# Patient Record
Sex: FEMALE | Race: WHITE | NOT HISPANIC OR LATINO | URBAN - METROPOLITAN AREA
[De-identification: names, ages, dates, MRNs, and addresses within clinical notes are randomized per-mention and may not be internally consistent; named-entity substitution may affect disease eponyms.]

---

## 2020-01-22 ENCOUNTER — HOSPITAL ENCOUNTER (OUTPATIENT)
Dept: NUCLEAR MEDICINE | Facility: CLINIC | Age: 60
Setting detail: NUCLEAR MEDICINE
End: 2020-01-22
Attending: SPECIALIST
Payer: COMMERCIAL

## 2020-01-22 ENCOUNTER — HOSPITAL ENCOUNTER (OUTPATIENT)
Dept: NUCLEAR MEDICINE | Facility: CLINIC | Age: 60
Setting detail: NUCLEAR MEDICINE
Discharge: HOME | End: 2020-01-22
Attending: SPECIALIST | Admitting: SPECIALIST
Payer: COMMERCIAL

## 2020-01-22 DIAGNOSIS — E21.3 HYPERPARATHYROIDISM (H): ICD-10-CM

## 2020-01-22 PROCEDURE — 78072 PARATHYRD PLANAR W/SPECT&CT: CPT

## 2020-01-22 PROCEDURE — 34300033 ZZH RX 343: Performed by: RADIOLOGY

## 2020-01-22 PROCEDURE — A9500 TC99M SESTAMIBI: HCPCS | Performed by: RADIOLOGY

## 2020-01-22 RX ADMIN — Medication 28.5 MILLICURIE: at 08:58

## 2021-01-04 ENCOUNTER — HEALTH MAINTENANCE LETTER (OUTPATIENT)
Age: 61
End: 2021-01-04

## 2021-02-10 ENCOUNTER — TELEPHONE (OUTPATIENT)
Dept: SURGERY | Facility: CLINIC | Age: 61
End: 2021-02-10

## 2021-02-10 NOTE — TELEPHONE ENCOUNTER
Called patient regarding referral from Dr Kiara Garza for thyroid to [facility], VM left with call back number

## 2021-02-12 ENCOUNTER — TELEPHONE (OUTPATIENT)
Dept: SURGERY | Facility: CLINIC | Age: 61
End: 2021-02-12

## 2021-02-18 ENCOUNTER — OFFICE VISIT (OUTPATIENT)
Dept: SURGERY | Facility: CLINIC | Age: 61
End: 2021-02-18
Payer: COMMERCIAL

## 2021-02-18 VITALS
HEIGHT: 62 IN | WEIGHT: 144 LBS | BODY MASS INDEX: 26.5 KG/M2 | DIASTOLIC BLOOD PRESSURE: 70 MMHG | HEART RATE: 83 BPM | SYSTOLIC BLOOD PRESSURE: 120 MMHG

## 2021-02-18 DIAGNOSIS — E21.3 HYPERPARATHYROIDISM (H): ICD-10-CM

## 2021-02-18 DIAGNOSIS — N25.81 SECONDARY RENAL HYPERPARATHYROIDISM (H): Primary | ICD-10-CM

## 2021-02-18 PROCEDURE — 99205 OFFICE O/P NEW HI 60 MIN: CPT | Performed by: SURGERY

## 2021-02-18 RX ORDER — LEVOTHYROXINE SODIUM 150 UG/1
150 TABLET ORAL
COMMUNITY

## 2021-02-18 RX ORDER — ROSUVASTATIN CALCIUM 40 MG/1
40 TABLET, COATED ORAL EVERY EVENING
COMMUNITY

## 2021-02-18 RX ORDER — AMLODIPINE BESYLATE 5 MG/1
5 TABLET ORAL 2 TIMES DAILY
COMMUNITY

## 2021-02-18 RX ORDER — ASPIRIN 81 MG/1
81 TABLET ORAL EVERY EVENING
COMMUNITY

## 2021-02-18 RX ORDER — LISINOPRIL 10 MG/1
10 TABLET ORAL EVERY MORNING
COMMUNITY

## 2021-02-18 RX ORDER — CLOPIDOGREL BISULFATE 75 MG/1
75 TABLET ORAL EVERY MORNING
COMMUNITY

## 2021-02-18 ASSESSMENT — MIFFLIN-ST. JEOR: SCORE: 1163.49

## 2021-02-18 NOTE — LETTER
[facility]    [address], [address]  [place], [place] [address]  Phone [phone]  Fax [phone]    [address], [address]  [facility] [place] [address]  Phone [phone]  Fax [phone]    [email or web address]     February 18, 2021      Surgery Consultation, [facility], TAMMIE Haley MD, MD     Farzana Moreno MRN# [id number]   YOB: 1960 Age: 60 year old      PCP:  Gege Jonas [phone]     Chief Complaint: Hyperparathyroidism     Pt was seen in consultation from Gege Jonas.     History of Present Illness:  Farzana Moreno is a 60 year old female who presented with a history of hypercalcemia for several years.  This has been asymptomatic but progressive.  A PTH was drawn approximately 1 year ago and preliminary discussions were had about surgery for her hyperparathyroidism.  Unfortunately, this coincided with some significant cardiac issues, and patient went on to have several stents placed.  She has been on Plavix and aspirin, but is nearly a year out.  Her calcium has been slowly increasing and was 11.2 recently.  Her parathyroid hormone has been variable but the highest I saw was greater than 100.  No family history of parathyroid disease but the patient has been taking levothyroxine for hypothyroidism for many years.  She underwent a parathyroid sestamibi scan which did not reveal any suspicious parathyroid activity.  She has not had a bone scan and has no history of nephrolithiasis.  She is here to discuss her diagnosis.     PMH:  Farzana Moreno  has no past medical history on file.  PSH:  Farzana Moreno  has no past surgical history on file.     Home medications and allergies reviewed.     Social History:  Farzana Moreno  reports that she has never smoked. She has never used smokeless tobacco.  Family History:  Farzana Moreno  "family history is not on file.     ROS:  The 10 point Review of Systems is negative other than noted in the HPI.  No fevers or chills.  No recent sleep disturbances or difficulties focusing.     Physical Exam:  Blood pressure 120/70, pulse 83, height 1.562 m (5' 1.5\"), weight 65.3 kg (144 lb).  144 lbs 0 oz  Healthy-appearing female in no distress.  Patient has a pleasant affect and communicates well.   Pupils equal round and reactive to light.   No cervical lymphadenopathy or thyromegaly.  Good neck range of motion, no surgical scars.  Lung fields clear, breathing comfortably.   Heart normal sinus rhythm.  No murmurs rubs or gallops.  Abdomen soft, nontender, nondistended.  Skin warm, dry.  No obvious rashes or lesions.     All new lab and imaging data was reviewed.  Single isotope parathyroid scan performed recently did not reveal any suspicious parathyroid lesions.      Assessment/plan: Pleasant healthy 60-year-old female with recently diagnosed coronary disease, now presents for asymptomatic hyperparathyroidism.  Her diagnosis is fairly convincing and I think she would benefit from surgical correction.  I have recommended that we obtain a high-resolution neck CT scan to look further for any localizing signs of parathyroid adenoma.  If the CT scan does not reveal anything, I would advocate a bilateral neck exploration in 4 gland parathyroid visualization.  This may or may not be done as an outpatient depending on the length of the case and the degree of exploration.  I explained to the patient that in 85% of cases, hyperparathyroidism is caused by a single parathyroid adenoma.  The other 15% of cases are due to dual adenomas or parathyroid hyperplasia.  She understands the risks and benefits which may include failure to cure, hypoparathyroidism, bleeding, or injury to the recurrent laryngeal nerve.  We will get her CT scan scheduled and I will discuss the results with her and come up with a plan.  We would need " to hold her Plavix for 5 to 10 days prior to surgery.  Surgical co-morbities include coronary artery disease.     Toño Haley M.D.  [facility]  [phone]

## 2021-02-19 ENCOUNTER — HOSPITAL ENCOUNTER (OUTPATIENT)
Dept: CT IMAGING | Facility: CLINIC | Age: 61
Discharge: HOME | End: 2021-02-19
Attending: SURGERY | Admitting: SURGERY
Payer: COMMERCIAL

## 2021-02-19 DIAGNOSIS — E21.3 HYPERPARATHYROIDISM (H): ICD-10-CM

## 2021-02-19 PROCEDURE — 70492 CT SFT TSUE NCK W/O & W/DYE: CPT

## 2021-02-19 PROCEDURE — 250N000011 HC RX IP 250 OP 636: Performed by: SURGERY

## 2021-02-19 PROCEDURE — 250N000009 HC RX 250: Performed by: SURGERY

## 2021-02-19 RX ORDER — IOPAMIDOL 755 MG/ML
75 INJECTION, SOLUTION INTRAVASCULAR ONCE
Status: COMPLETED | OUTPATIENT
Start: 2021-02-19 | End: 2021-02-19

## 2021-02-19 RX ADMIN — SODIUM CHLORIDE 80 ML: 9 INJECTION, SOLUTION INTRAVENOUS at 17:21

## 2021-02-19 RX ADMIN — IOPAMIDOL 75 ML: 755 INJECTION, SOLUTION INTRAVENOUS at 17:20

## 2021-02-19 NOTE — PROGRESS NOTES
"[facility]         Mauricio Haley MD, MD    Farzana Moreno MRN# [id number]   YOB: 1960 Age: 60 year old     PCP:  Gege Jonas [phone]    Chief Complaint: Hyperparathyroidism    Pt was seen in consultation from Gege Jonas.    History of Present Illness:  Farzana Moreno is a 60 year old female who presented with a history of hypercalcemia for several years.  This has been asymptomatic but progressive.  A PTH was drawn approximately 1 year ago and preliminary discussions were had about surgery for her hyperparathyroidism.  Unfortunately, this coincided with some significant cardiac issues, and patient went on to have several stents placed.  She has been on Plavix and aspirin, but is nearly a year out.  Her calcium has been slowly increasing and was 11.2 recently.  Her parathyroid hormone has been variable but the highest I saw was greater than 100.  No family history of parathyroid disease but the patient has been taking levothyroxine for hypothyroidism for many years.  She underwent a parathyroid sestamibi scan which did not reveal any suspicious parathyroid activity.  She has not had a bone scan and has no history of nephrolithiasis.  She is here to discuss her diagnosis.    PMH:  Farzana Moreno  has no past medical history on file.  PSH:  Farzana Moreno  has no past surgical history on file.    Home medications and allergies reviewed.    Social History:  Farzana Moreno  reports that she has never smoked. She has never used smokeless tobacco.  Family History:  Farzana Moreno family history is not on file.    ROS:  The 10 point Review of Systems is negative other than noted in the HPI.  No fevers or chills.  No recent sleep disturbances or difficulties focusing.    Physical Exam:  Blood pressure 120/70, pulse 83, height 1.562 m (5' 1.5\"), weight 65.3 kg (144 lb).  144 lbs 0 oz  Healthy-appearing female in no " distress.  Patient has a pleasant affect and communicates well.   Pupils equal round and reactive to light.   No cervical lymphadenopathy or thyromegaly.  Good neck range of motion, no surgical scars.  Lung fields clear, breathing comfortably.   Heart normal sinus rhythm.  No murmurs rubs or gallops.  Abdomen soft, nontender, nondistended.  Skin warm, dry.  No obvious rashes or lesions.    All new lab and imaging data was reviewed.  Single isotope parathyroid scan performed recently did not reveal any suspicious parathyroid lesions.     Assessment/plan: Pleasant healthy 60-year-old female with recently diagnosed coronary disease, now presents for asymptomatic hyperparathyroidism.  Her diagnosis is fairly convincing and I think she would benefit from surgical correction.  I have recommended that we obtain a high-resolution neck CT scan to look further for any localizing signs of parathyroid adenoma.  If the CT scan does not reveal anything, I would advocate a bilateral neck exploration in 4 gland parathyroid visualization.  This may or may not be done as an outpatient depending on the length of the case and the degree of exploration.  I explained to the patient that in 85% of cases, hyperparathyroidism is caused by a single parathyroid adenoma.  The other 15% of cases are due to dual adenomas or parathyroid hyperplasia.  She understands the risks and benefits which may include failure to cure, hypoparathyroidism, bleeding, or injury to the recurrent laryngeal nerve.  We will get her CT scan scheduled and I will discuss the results with her and come up with a plan.  We would need to hold her Plavix for 5 to 10 days prior to surgery.  Surgical co-morbities include coronary artery disease.    Toño Haley M.D.  [facility]  [phone]    Please route or send letter to:  Primary Care Provider (PCP) and Referring Provider

## 2021-02-23 ENCOUNTER — TELEPHONE (OUTPATIENT)
Dept: SURGERY | Facility: CLINIC | Age: 61
End: 2021-02-23

## 2021-02-23 ENCOUNTER — PREP FOR PROCEDURE (OUTPATIENT)
Dept: SURGERY | Facility: CLINIC | Age: 61
End: 2021-02-23

## 2021-02-23 DIAGNOSIS — E21.3 HYPERPARATHYROIDISM (H): Primary | ICD-10-CM

## 2021-02-23 NOTE — TELEPHONE ENCOUNTER
Orders received for parathyroidectomy with Dr. Mauriico Haley.      Left message for patient to call me at her convenience to schedule surgery.     Marta BLAKELY    Surgery Coordinator  [facility]  [facility]  [phone]

## 2021-02-24 ENCOUNTER — TELEPHONE (OUTPATIENT)
Dept: SURGERY | Facility: CLINIC | Age: 61
End: 2021-02-24

## 2021-02-24 NOTE — TELEPHONE ENCOUNTER
Type of surgery: Right neck exploration with excision parathyroid adenoma  Location of surgery: [facility]  Date and time of surgery: 3/30/21 at 2pm  Surgeon: Dr. Mauricio Haley  Pre-Op Appt Date: Patient to schedule  Post-Op Appt Date: Patient to schedule   Packet sent out: Yes  Pre-cert/Authorization completed:  Not Applicable  Date: 2/24/21

## 2021-03-05 DIAGNOSIS — Z11.59 ENCOUNTER FOR SCREENING FOR OTHER VIRAL DISEASES: Primary | ICD-10-CM

## 2021-03-15 ENCOUNTER — TELEPHONE (OUTPATIENT)
Dept: SURGERY | Facility: CLINIC | Age: 61
End: 2021-03-15

## 2021-03-15 NOTE — TELEPHONE ENCOUNTER
Name of caller: Patient    Reason for Call:  Scheduled for parathyroidectomy 3/30 and would like to discuss recovery expectations.  Thought [facility] mentioned 1-2 days, but in looking online she is worried it might be longer (scheduled to babysit grandchildren a week after surgery)    Surgeon:  Dr. Haley     Recent Surgery:  No    If yes, when & what type:  N/A      Best phone number to reach pt at is: [phone]  Ok to leave a message with medical info? Yes.    Pharmacy preferred (if calling for a refill): N/A

## 2021-03-15 NOTE — TELEPHONE ENCOUNTER
Planned Procedure:  Right neck exploration with excision of parathyroid adenoma    Date:  03/30/21    Surgeon: Sudha    Patient wondering if she will be fine one week after surgery to help her  watch their grandkids.    Informed her that there is a weight and activity restriction for 1-2 weeks after surgery. But, she should be feeling well enough as long as she is not doing any lifting or anything overly strenuous.    She verbalized understanding and will call PRN.    Jyoti Her RN-BSN

## 2021-03-26 DIAGNOSIS — Z11.59 ENCOUNTER FOR SCREENING FOR OTHER VIRAL DISEASES: ICD-10-CM

## 2021-03-26 LAB
SARS-COV-2 RNA RESP QL NAA+PROBE: NORMAL
SPECIMEN SOURCE: NORMAL

## 2021-03-26 PROCEDURE — U0003 INFECTIOUS AGENT DETECTION BY NUCLEIC ACID (DNA OR RNA); SEVERE ACUTE RESPIRATORY SYNDROME CORONAVIRUS 2 (SARS-COV-2) (CORONAVIRUS DISEASE [COVID-19]), AMPLIFIED PROBE TECHNIQUE, MAKING USE OF HIGH THROUGHPUT TECHNOLOGIES AS DESCRIBED BY CMS-2020-01-R: HCPCS | Performed by: SURGERY

## 2021-03-26 PROCEDURE — U0005 INFEC AGEN DETEC AMPLI PROBE: HCPCS | Performed by: SURGERY

## 2021-03-27 LAB
LABORATORY COMMENT REPORT: NORMAL
SARS-COV-2 RNA RESP QL NAA+PROBE: NEGATIVE
SPECIMEN SOURCE: NORMAL

## 2021-03-29 ENCOUNTER — ANESTHESIA EVENT (OUTPATIENT)
Dept: SURGERY | Facility: CLINIC | Age: 61
End: 2021-03-29
Payer: COMMERCIAL

## 2021-03-29 RX ORDER — VITS A,C,E/LUTEIN/MINERALS 300MCG-200
1 TABLET ORAL DAILY
COMMUNITY

## 2021-03-29 RX ORDER — EZETIMIBE 10 MG/1
10 TABLET ORAL EVERY EVENING
COMMUNITY

## 2021-03-29 RX ORDER — NITROGLYCERIN 0.4 MG/1
0.4 TABLET SUBLINGUAL EVERY 5 MIN PRN
COMMUNITY

## 2021-03-29 RX ORDER — BETAMETHASONE DIPROPIONATE 0.5 MG/G
CREAM TOPICAL 2 TIMES DAILY
COMMUNITY

## 2021-03-30 ENCOUNTER — APPOINTMENT (OUTPATIENT)
Dept: SURGERY | Facility: PHYSICIAN GROUP | Age: 61
End: 2021-03-30
Payer: COMMERCIAL

## 2021-03-30 ENCOUNTER — ANESTHESIA (OUTPATIENT)
Dept: SURGERY | Facility: CLINIC | Age: 61
End: 2021-03-30
Payer: COMMERCIAL

## 2021-03-30 ENCOUNTER — HOSPITAL ENCOUNTER (OUTPATIENT)
Facility: CLINIC | Age: 61
Discharge: HOME | End: 2021-03-30
Attending: SURGERY | Admitting: SURGERY
Payer: COMMERCIAL

## 2021-03-30 VITALS
DIASTOLIC BLOOD PRESSURE: 81 MMHG | SYSTOLIC BLOOD PRESSURE: 142 MMHG | TEMPERATURE: 97.9 F | HEIGHT: 61 IN | HEART RATE: 75 BPM | BODY MASS INDEX: 27.75 KG/M2 | OXYGEN SATURATION: 97 % | RESPIRATION RATE: 14 BRPM | WEIGHT: 147 LBS

## 2021-03-30 DIAGNOSIS — E21.3 HYPERPARATHYROIDISM (H): ICD-10-CM

## 2021-03-30 LAB
ANION GAP SERPL CALCULATED.3IONS-SCNC: 3 MMOL/L (ref 3–14)
BUN SERPL-MCNC: 14 MG/DL (ref 7–30)
CALCIUM SERPL-MCNC: 9.9 MG/DL (ref 8.5–10.1)
CHLORIDE SERPL-SCNC: 109 MMOL/L (ref 94–109)
CO2 SERPL-SCNC: 26 MMOL/L (ref 20–32)
CREAT SERPL-MCNC: 0.57 MG/DL (ref 0.52–1.04)
GFR SERPL CREATININE-BSD FRML MDRD: >90 ML/MIN/{1.73_M2}
GLUCOSE SERPL-MCNC: 89 MG/DL (ref 70–99)
POTASSIUM SERPL-SCNC: 4.1 MMOL/L (ref 3.4–5.3)
PTH-INTACT SERPL-MCNC: 105 PG/ML (ref 12–64)
PTH-INTACT SERPL-MCNC: 18 PG/ML (ref 12–64)
PTH-INTACT SERPL-MCNC: 24 PG/ML (ref 12–64)
SODIUM SERPL-SCNC: 138 MMOL/L (ref 133–144)

## 2021-03-30 PROCEDURE — 272N000001 HC OR GENERAL SUPPLY STERILE: Performed by: SURGERY

## 2021-03-30 PROCEDURE — 370N000017 HC ANESTHESIA TECHNICAL FEE, PER MIN: Performed by: SURGERY

## 2021-03-30 PROCEDURE — 88331 PATH CONSLTJ SURG 1 BLK 1SPC: CPT | Mod: TC | Performed by: SURGERY

## 2021-03-30 PROCEDURE — 80048 BASIC METABOLIC PNL TOTAL CA: CPT | Performed by: SURGERY

## 2021-03-30 PROCEDURE — 250N000009 HC RX 250: Performed by: ANESTHESIOLOGY

## 2021-03-30 PROCEDURE — 250N000009 HC RX 250: Performed by: NURSE ANESTHETIST, CERTIFIED REGISTERED

## 2021-03-30 PROCEDURE — 250N000009 HC RX 250: Performed by: SURGERY

## 2021-03-30 PROCEDURE — 88331 PATH CONSLTJ SURG 1 BLK 1SPC: CPT | Mod: 26 | Performed by: PATHOLOGY

## 2021-03-30 PROCEDURE — 258N000003 HC RX IP 258 OP 636: Performed by: NURSE ANESTHETIST, CERTIFIED REGISTERED

## 2021-03-30 PROCEDURE — 88305 TISSUE EXAM BY PATHOLOGIST: CPT | Mod: 26 | Performed by: PATHOLOGY

## 2021-03-30 PROCEDURE — 710N000009 HC RECOVERY PHASE 1, LEVEL 1, PER MIN: Performed by: SURGERY

## 2021-03-30 PROCEDURE — 250N000025 HC SEVOFLURANE, PER MIN: Performed by: SURGERY

## 2021-03-30 PROCEDURE — 36415 COLL VENOUS BLD VENIPUNCTURE: CPT | Performed by: SURGERY

## 2021-03-30 PROCEDURE — 250N000011 HC RX IP 250 OP 636: Performed by: NURSE ANESTHETIST, CERTIFIED REGISTERED

## 2021-03-30 PROCEDURE — 88305 TISSUE EXAM BY PATHOLOGIST: CPT | Mod: TC | Performed by: SURGERY

## 2021-03-30 PROCEDURE — 999N000141 HC STATISTIC PRE-PROCEDURE NURSING ASSESSMENT: Performed by: SURGERY

## 2021-03-30 PROCEDURE — 250N000011 HC RX IP 250 OP 636: Performed by: SURGERY

## 2021-03-30 PROCEDURE — 360N000077 HC SURGERY LEVEL 4, PER MIN: Performed by: SURGERY

## 2021-03-30 PROCEDURE — 83970 ASSAY OF PARATHORMONE: CPT | Performed by: SURGERY

## 2021-03-30 RX ORDER — DEXAMETHASONE SODIUM PHOSPHATE 4 MG/ML
INJECTION, SOLUTION INTRA-ARTICULAR; INTRALESIONAL; INTRAMUSCULAR; INTRAVENOUS; SOFT TISSUE PRN
Status: DISCONTINUED | OUTPATIENT
Start: 2021-03-30 | End: 2021-03-30

## 2021-03-30 RX ORDER — OXYCODONE HYDROCHLORIDE 5 MG/1
5-10 TABLET ORAL
Qty: 8 TABLET | Refills: 0 | Status: SHIPPED | OUTPATIENT
Start: 2021-03-30 | End: 2021-04-13

## 2021-03-30 RX ORDER — SODIUM CHLORIDE, SODIUM LACTATE, POTASSIUM CHLORIDE, CALCIUM CHLORIDE 600; 310; 30; 20 MG/100ML; MG/100ML; MG/100ML; MG/100ML
INJECTION, SOLUTION INTRAVENOUS CONTINUOUS PRN
Status: DISCONTINUED | OUTPATIENT
Start: 2021-03-30 | End: 2021-03-30

## 2021-03-30 RX ORDER — LIDOCAINE HYDROCHLORIDE 20 MG/ML
INJECTION, SOLUTION INFILTRATION; PERINEURAL PRN
Status: DISCONTINUED | OUTPATIENT
Start: 2021-03-30 | End: 2021-03-30

## 2021-03-30 RX ORDER — ACETAMINOPHEN 325 MG/1
650 TABLET ORAL
Status: DISCONTINUED | OUTPATIENT
Start: 2021-03-30 | End: 2021-03-30 | Stop reason: HOSPADM

## 2021-03-30 RX ORDER — OXYCODONE HYDROCHLORIDE 5 MG/1
5 TABLET ORAL
Status: DISCONTINUED | OUTPATIENT
Start: 2021-03-30 | End: 2021-03-30 | Stop reason: HOSPADM

## 2021-03-30 RX ORDER — ONDANSETRON 4 MG/1
4 TABLET, ORALLY DISINTEGRATING ORAL EVERY 30 MIN PRN
Status: DISCONTINUED | OUTPATIENT
Start: 2021-03-30 | End: 2021-03-30 | Stop reason: HOSPADM

## 2021-03-30 RX ORDER — NALOXONE HYDROCHLORIDE 0.4 MG/ML
0.2 INJECTION, SOLUTION INTRAMUSCULAR; INTRAVENOUS; SUBCUTANEOUS
Status: DISCONTINUED | OUTPATIENT
Start: 2021-03-30 | End: 2021-03-30 | Stop reason: HOSPADM

## 2021-03-30 RX ORDER — MAGNESIUM HYDROXIDE 1200 MG/15ML
LIQUID ORAL PRN
Status: DISCONTINUED | OUTPATIENT
Start: 2021-03-30 | End: 2021-03-30 | Stop reason: HOSPADM

## 2021-03-30 RX ORDER — AMOXICILLIN 250 MG
1-2 CAPSULE ORAL 2 TIMES DAILY
Qty: 30 TABLET | Refills: 0 | Status: SHIPPED | OUTPATIENT
Start: 2021-03-30

## 2021-03-30 RX ORDER — FENTANYL CITRATE 50 UG/ML
25-50 INJECTION, SOLUTION INTRAMUSCULAR; INTRAVENOUS EVERY 5 MIN PRN
Status: DISCONTINUED | OUTPATIENT
Start: 2021-03-30 | End: 2021-03-30 | Stop reason: HOSPADM

## 2021-03-30 RX ORDER — NALOXONE HYDROCHLORIDE 0.4 MG/ML
0.4 INJECTION, SOLUTION INTRAMUSCULAR; INTRAVENOUS; SUBCUTANEOUS
Status: DISCONTINUED | OUTPATIENT
Start: 2021-03-30 | End: 2021-03-30 | Stop reason: HOSPADM

## 2021-03-30 RX ORDER — PROPOFOL 10 MG/ML
INJECTION, EMULSION INTRAVENOUS CONTINUOUS PRN
Status: DISCONTINUED | OUTPATIENT
Start: 2021-03-30 | End: 2021-03-30

## 2021-03-30 RX ORDER — SCOLOPAMINE TRANSDERMAL SYSTEM 1 MG/1
1 PATCH, EXTENDED RELEASE TRANSDERMAL
Status: DISCONTINUED | OUTPATIENT
Start: 2021-03-30 | End: 2021-03-30 | Stop reason: HOSPADM

## 2021-03-30 RX ORDER — HYDROMORPHONE HYDROCHLORIDE 1 MG/ML
.3-.5 INJECTION, SOLUTION INTRAMUSCULAR; INTRAVENOUS; SUBCUTANEOUS EVERY 10 MIN PRN
Status: DISCONTINUED | OUTPATIENT
Start: 2021-03-30 | End: 2021-03-30 | Stop reason: HOSPADM

## 2021-03-30 RX ORDER — EPHEDRINE SULFATE 50 MG/ML
INJECTION, SOLUTION INTRAMUSCULAR; INTRAVENOUS; SUBCUTANEOUS PRN
Status: DISCONTINUED | OUTPATIENT
Start: 2021-03-30 | End: 2021-03-30

## 2021-03-30 RX ORDER — BUPIVACAINE HYDROCHLORIDE AND EPINEPHRINE 2.5; 5 MG/ML; UG/ML
INJECTION, SOLUTION EPIDURAL; INFILTRATION; INTRACAUDAL; PERINEURAL
Status: DISCONTINUED
Start: 2021-03-30 | End: 2021-03-30 | Stop reason: HOSPADM

## 2021-03-30 RX ORDER — SODIUM CHLORIDE, SODIUM LACTATE, POTASSIUM CHLORIDE, CALCIUM CHLORIDE 600; 310; 30; 20 MG/100ML; MG/100ML; MG/100ML; MG/100ML
INJECTION, SOLUTION INTRAVENOUS CONTINUOUS
Status: DISCONTINUED | OUTPATIENT
Start: 2021-03-30 | End: 2021-03-30 | Stop reason: HOSPADM

## 2021-03-30 RX ORDER — CEFAZOLIN SODIUM 2 G/100ML
2 INJECTION, SOLUTION INTRAVENOUS SEE ADMIN INSTRUCTIONS
Status: DISCONTINUED | OUTPATIENT
Start: 2021-03-30 | End: 2021-03-30 | Stop reason: HOSPADM

## 2021-03-30 RX ORDER — PROPOFOL 10 MG/ML
INJECTION, EMULSION INTRAVENOUS PRN
Status: DISCONTINUED | OUTPATIENT
Start: 2021-03-30 | End: 2021-03-30

## 2021-03-30 RX ORDER — CEFAZOLIN SODIUM 2 G/100ML
2 INJECTION, SOLUTION INTRAVENOUS
Status: DISCONTINUED | OUTPATIENT
Start: 2021-03-30 | End: 2021-03-30 | Stop reason: HOSPADM

## 2021-03-30 RX ORDER — ONDANSETRON 2 MG/ML
4 INJECTION INTRAMUSCULAR; INTRAVENOUS EVERY 30 MIN PRN
Status: DISCONTINUED | OUTPATIENT
Start: 2021-03-30 | End: 2021-03-30 | Stop reason: HOSPADM

## 2021-03-30 RX ORDER — REMIFENTANIL HYDROCHLORIDE 1 MG/ML
INJECTION, POWDER, LYOPHILIZED, FOR SOLUTION INTRAVENOUS CONTINUOUS PRN
Status: DISCONTINUED | OUTPATIENT
Start: 2021-03-30 | End: 2021-03-30

## 2021-03-30 RX ORDER — ONDANSETRON 2 MG/ML
INJECTION INTRAMUSCULAR; INTRAVENOUS PRN
Status: DISCONTINUED | OUTPATIENT
Start: 2021-03-30 | End: 2021-03-30

## 2021-03-30 RX ORDER — MEPERIDINE HYDROCHLORIDE 25 MG/ML
12.5 INJECTION INTRAMUSCULAR; INTRAVENOUS; SUBCUTANEOUS
Status: DISCONTINUED | OUTPATIENT
Start: 2021-03-30 | End: 2021-03-30 | Stop reason: HOSPADM

## 2021-03-30 RX ORDER — FENTANYL CITRATE 50 UG/ML
INJECTION, SOLUTION INTRAMUSCULAR; INTRAVENOUS PRN
Status: DISCONTINUED | OUTPATIENT
Start: 2021-03-30 | End: 2021-03-30

## 2021-03-30 RX ORDER — BUPIVACAINE HYDROCHLORIDE AND EPINEPHRINE 2.5; 5 MG/ML; UG/ML
INJECTION, SOLUTION INFILTRATION; PERINEURAL PRN
Status: DISCONTINUED | OUTPATIENT
Start: 2021-03-30 | End: 2021-03-30 | Stop reason: HOSPADM

## 2021-03-30 RX ADMIN — Medication 5 MG: at 14:39

## 2021-03-30 RX ADMIN — PROPOFOL 200 MG: 10 INJECTION, EMULSION INTRAVENOUS at 14:18

## 2021-03-30 RX ADMIN — Medication 5 MG: at 14:33

## 2021-03-30 RX ADMIN — PHENYLEPHRINE HYDROCHLORIDE 100 MCG: 10 INJECTION INTRAVENOUS at 15:11

## 2021-03-30 RX ADMIN — ONDANSETRON 4 MG: 2 INJECTION INTRAMUSCULAR; INTRAVENOUS at 16:38

## 2021-03-30 RX ADMIN — FENTANYL CITRATE 100 MCG: 50 INJECTION, SOLUTION INTRAMUSCULAR; INTRAVENOUS at 14:18

## 2021-03-30 RX ADMIN — PROPOFOL 30 MCG/KG/MIN: 10 INJECTION, EMULSION INTRAVENOUS at 14:23

## 2021-03-30 RX ADMIN — PHENYLEPHRINE HYDROCHLORIDE 100 MCG: 10 INJECTION INTRAVENOUS at 14:47

## 2021-03-30 RX ADMIN — SCOPOLAMINE 1 PATCH: 1 PATCH TRANSDERMAL at 13:11

## 2021-03-30 RX ADMIN — SODIUM CHLORIDE, POTASSIUM CHLORIDE, SODIUM LACTATE AND CALCIUM CHLORIDE: 600; 310; 30; 20 INJECTION, SOLUTION INTRAVENOUS at 14:11

## 2021-03-30 RX ADMIN — MIDAZOLAM 2 MG: 1 INJECTION INTRAMUSCULAR; INTRAVENOUS at 14:12

## 2021-03-30 RX ADMIN — PHENYLEPHRINE HYDROCHLORIDE 100 MCG: 10 INJECTION INTRAVENOUS at 14:55

## 2021-03-30 RX ADMIN — PHENYLEPHRINE HYDROCHLORIDE 0.25 MCG/KG/MIN: 10 INJECTION INTRAVENOUS at 15:06

## 2021-03-30 RX ADMIN — REMIFENTANIL HYDROCHLORIDE 0.05 MCG/KG/MIN: 1 INJECTION, POWDER, LYOPHILIZED, FOR SOLUTION INTRAVENOUS at 14:23

## 2021-03-30 RX ADMIN — CEFAZOLIN SODIUM 2 G: 2 INJECTION, SOLUTION INTRAVENOUS at 14:32

## 2021-03-30 RX ADMIN — Medication 5 MG: at 14:36

## 2021-03-30 RX ADMIN — SODIUM CHLORIDE, POTASSIUM CHLORIDE, SODIUM LACTATE AND CALCIUM CHLORIDE: 600; 310; 30; 20 INJECTION, SOLUTION INTRAVENOUS at 14:28

## 2021-03-30 RX ADMIN — SUCCINYLCHOLINE CHLORIDE 100 MG: 20 INJECTION, SOLUTION INTRAMUSCULAR; INTRAVENOUS; PARENTERAL at 14:18

## 2021-03-30 RX ADMIN — DEXAMETHASONE SODIUM PHOSPHATE 4 MG: 4 INJECTION, SOLUTION INTRA-ARTICULAR; INTRALESIONAL; INTRAMUSCULAR; INTRAVENOUS; SOFT TISSUE at 14:34

## 2021-03-30 RX ADMIN — LIDOCAINE HYDROCHLORIDE 100 MG: 20 INJECTION, SOLUTION INFILTRATION; PERINEURAL at 14:18

## 2021-03-30 ASSESSMENT — LIFESTYLE VARIABLES: TOBACCO_USE: 0

## 2021-03-30 ASSESSMENT — MIFFLIN-ST. JEOR: SCORE: 1169.17

## 2021-03-30 NOTE — ANESTHESIA CARE TRANSFER NOTE
Patient: Farzana Moreno    Procedure(s):  RIGHT NECK EXPLORATION WITH EXCISION PARATHYROID ADENOMA    Diagnosis: Hyperparathyroidism (H) [E21.3]  Diagnosis Additional Information: No value filed.    Anesthesia Type:   General     Note:    Oropharynx: oropharynx clear of all foreign objects and spontaneously breathing  Level of Consciousness: awake  Oxygen Supplementation: face mask  Level of Supplemental Oxygen (L/min / FiO2): 4  Independent Airway: airway patency satisfactory and stable  Dentition: dentition unchanged  Vital Signs Stable: post-procedure vital signs reviewed and stable  Report to RN Given: handoff report given  Patient transferred to: PACU  Comments: Neuromuscular blockade not used after succinylcholine for intubation, spontaneous return of TOF 4/4 with sustained tetany, spontaneous respirations, adequate tidal volumes, followed commands to voice, oropharynx suctioned with soft flexible catheter, extubated with suction, airway patent after extubation.  Oxygen via facemask at 4 liters per minute to PACU. Oxygen tubing connected to wall O2 in PACU, SpO2, NiBP, and EKG monitors and alarms on and functioning, report on patient's clinical status given to PACU RN, RN questions answered.     Handoff Report: Identifed the Patient, Identified the Reponsible Provider, Reviewed the pertinent medical history, Discussed the surgical course, Reviewed Intra-OP anesthesia mangement and issues during anesthesia, Set expectations for post-procedure period and Allowed opportunity for questions and acknowledgement of understanding      Vitals: (Last set prior to Anesthesia Care Transfer)  CRNA VITALS  3/30/2021 1617 - 3/30/2021 1652      3/30/2021             Resp Rate (set):  10        Electronically Signed By: ALONDRA Traylor CRNA  March 30, 2021  4:52 PM

## 2021-03-30 NOTE — BRIEF OP NOTE
[facility]    Brief Operative Note    Pre-operative diagnosis: Hyperparathyroidism (H) [E21.3]  Post-operative diagnosis Same as pre-operative diagnosis    Procedure: Procedure(s):  RIGHT NECK EXPLORATION WITH EXCISION PARATHYROID ADENOMA  Surgeon: Surgeon(s) and Role:     * Mauricio Haley MD - Primary     * Dorinda Millard MD - Resident - Assisting  Anesthesia: General   Estimated blood loss: Minimal  Drains: None  Specimens:   ID Type Source Tests Collected by Time Destination   A : RIGHT SUPERIOR PARATHYROID  Tissue Parathyroid SURGICAL PATHOLOGY EXAM Mauricio Haley MD 3/30/2021  3:14 PM      Findings:   Right inferior parathyroid normal appearing. Right superior parathyroid abnormal and resected.  Complications: None.  Implants: * No implants in log *

## 2021-03-30 NOTE — DISCHARGE INSTRUCTIONS
**If you have concerns or questions about your procedure,    please contact Dr Haley at  [phone]**        Same Day Surgery Discharge Instructions for  Sedation and General Anesthesia       It's not unusual to feel dizzy, light-headed or faint for up to 24 hours after surgery or while taking pain medication.  If you have these symptoms: sit for a few minutes before standing and have someone assist you when you get up to walk or use the bathroom.      You should rest and relax for the next 24 hours. We recommend you make arrangements to have an adult stay with you for at least 24 hours after your discharge.  Avoid hazardous and strenuous activity.      DO NOT DRIVE any vehicle or operate mechanical equipment for 24 hours following the end of your surgery.  Even though you may feel normal, your reactions may be affected by the medication you have received.      Do not drink alcoholic beverages for 24 hours following surgery.       Slowly progress to your regular diet as you feel able. It's not unusual to feel nauseated and/or vomit after receiving anesthesia.  If you develop these symptoms, drink clear liquids (apple juice, ginger ale, broth, 7-up, etc. ) until you feel better.  If your nausea and vomiting persists for 24 hours, please notify your surgeon.        All narcotic pain medications, along with inactivity and anesthesia, can cause constipation. Drinking plenty of liquids and increasing fiber intake will help.      For any questions of a medical nature, call your surgeon.      Do not make important decisions for 24 hours.      If you had general anesthesia, you may have a sore throat for a couple of days related to the breathing tube used during surgery.  You may use Cepacol lozenges to help with this discomfort.  If it worsens or if you develop a fever, contact your surgeon.       If you feel your pain is not well managed with the pain medications prescribed by your surgeon, please contact your  surgeon's office to let them know so they can address your concerns.       CoVid 19 Information    We want to give you information regarding Covid. Please consult your primary care provider with any questions you might have.     Patient who have symptoms (cough, fever, or shortness of breath), need to isolate for 7 days from when symptoms started OR 72 hours after fever resolves (without fever reducing medications) AND improvement of respiratory symptoms (whichever is longer).      Isolate yourself at home (in own room/own bathroom if possible)    Do Not allow any visitors    Do Not go to work or school    Do Not go to [employer],  centers, shopping, or other public places.    Do Not shake hands.    Avoid close and intimate contact with others (hugging, kissing).    Follow CDC recommendations for household cleaning of frequently touched services.     After the initial 7 days, continue to isolate yourself from household members as much as possible. To continue decrease the risk of community spread and exposure, you and any members of your household should limit activities in public for 14 days after starting home isolation.     You can reference the following CDC link for helpful home isolation/care tips:  https://www.cdc.gov/coronavirus/2019-ncov/downloads/10Things.pdf    Protect Others:    Cover Your Mouth and Nose with a mask, disposable tissue or wash cloth to avoid spreading germs to others.    Wash your hands and face frequently with soap and water    Call Your Primary Doctor If: Breathing difficulty develops or you become worse.    For more information about COVID19 and options for caring for yourself at home, please visit the CDC website at https://www.cdc.gov/coronavirus/2019-ncov/about/steps-when-sick.html  For more options for care at [facility], please visit our website at [email or web address]        [facility]   Discharge  Instructions: Post-Operative Thyroid Surgery       ACTIVITY     Take frequent, short walks and increase your activity gradually.     Avoid strenuous physical activity or heavy lifting greater than 15-20 lbs. for 1-2 weeks. You may climb stairs.     You may drive without restrictions when you are not using any prescription pain medication and feel comfortable in a car.     You may return to work/school when you are comfortable without any prescription pain medication.     WOUND CARE     You may remove your bandage and shower 48 hours after the surgery. Pat your incision dry and leave it open to air. Re-apply dressing (Band-Aids or gauze/tape) as needed for comfort or drainage.     You may have steri-strips (looks like white tape) on your incision. You may peel off the steri-strips 2 weeks after your surgery if they have not peeled off on their own.     Do not soak your incision in a tub or pool for 2 weeks.     Do not apply any lotions, creams, or ointments to your incision.     A ridge under your incision is normal and will gradually resolve.     DIET     Start with liquids, then gradually resume your regular diet as tolerated.     Drink plenty of fluids to stay hydrated.     PAIN     Expect some tenderness and discomfort at the incision site(s). Use the prescribed pain medication at your discretion. Expect gradual resolution of your pain over several days.     You may take ibuprofen with food (unless you have been told not to) or ***acetaminophen/Tylenol instead of or in addition to your prescribed pain medication. If you are taking Norco or Percocet, do not take any additional acetaminophen/Tylenol.     Do not drink alcohol or drive while you are taking pain medications.     You may apply ice to your incisions in 20 minute intervals as needed for the next 48 hours. After that time, consider switching to heat if you prefer.     Watch for symptoms of numbness or tingling around the mouth or in the fingers or toes.  This may be a sign of a low calcium level. Please contact the office so we can evaluate your symptoms. You may need to have your blood calcium level checked by doing a simple blood test.     EXPECTATIONS     Pain medications can cause constipation. Limit use when possible. Take over the counter stool softener/stimulant, such as Colace or Senna, 1-2 times a day with plenty of water. You may take a mild over the counter laxative, such as Miralax or a suppository, as needed.     You may discontinue these medications once you are having regular bowel movements and/or are no longer taking your narcotic pain medication.     RETURN APPOINTMENT     Follow up with your surgeon in 2 weeks. Please call our office at [phone] to schedule your appointment. We are located at [address] [place] [address].     CALL OUR OFFICE -[phone] IF YOU HAVE:     Chills or fever above 101 F.     Increased redness, warmth, or drainage at your incisions.     Significant bleeding.     Pain not relieved by your pain medication or rest.     Increasing pain after the first 48 hours.     Any other concerns or questions.     Revised September 2020

## 2021-03-30 NOTE — ANESTHESIA PROCEDURE NOTES
Airway       Patient location during procedure: OR       Procedure Start/Stop Times: 3/30/2021 2:43 PM  Staff -        Other Anesthesia Staff: Lissette Zaldivar       Performed By: SRNA  Consent for Airway        Urgency: elective  Indications and Patient Condition       Indications for airway management: quoc-procedural       Induction type:intravenous       Mask difficulty assessment: 1 - vent by mask    Final Airway Details       Final airway type: endotracheal airway       Successful airway: NIM  Endotracheal Airway Details        ETT size (mm): 7.0       Cuffed: yes       Successful intubation technique: video laryngoscopy       VL Blade Size: Glidescope 3       Grade View of Cords: 1       Adjucts: stylet       Position: Right       Measured from: gums/teeth       Secured at (cm): 22       Bite block used: None    Post intubation assessment        Placement verified by: capnometry, equal breath sounds and chest rise        Number of attempts at approach: 1       Secured with: silk tape       Ease of procedure: easy       Dentition: Intact and Unchanged    Medication(s) Administered   Medication Administration Time: 3/30/2021 2:22 PM

## 2021-03-30 NOTE — ANESTHESIA PREPROCEDURE EVALUATION
"Anesthesia Pre-Procedure Evaluation    Patient: Farzana Moreno   MRN: [id number] : 1960        Preoperative Diagnosis: Hyperparathyroidism (H) [E21.3]   Procedure : Procedure(s):  RIGHT NECK EXPLORATION WITH EXCISION PARATHYROID ADENOMA     Past Medical History:   Diagnosis Date     Asymptomatic coronary heart disease      Benign essential hypertension      Dermatofibroma of face      Elevated fasting blood sugar      Fatigue      H/O heart artery stent 2020     hypothyroidism      Mixed hyperlipidemia      Parathyroid adenoma      Serum calcium elevated      Vitamin B12 deficiency (non anemic)      Vitamin D deficiency       Past Surgical History:   Procedure Laterality Date     EYE SURGERY        Allergies   Allergen Reactions     Sulfa Drugs Other (See Comments)     \"pricky fingers\"  rash      Social History     Tobacco Use     Smoking status: Never Smoker     Smokeless tobacco: Never Used   Substance Use Topics     Alcohol use: Not on file      Wt Readings from Last 1 Encounters:   21 66.7 kg (147 lb)        Anesthesia Evaluation   Pt has had prior anesthetic.     No history of anesthetic complications       ROS/MED HX  ENT/Pulmonary:    (-) tobacco use, asthma and sleep apnea   Neurologic:       Cardiovascular:     (+) hypertension--CAD --stent-.     METS/Exercise Tolerance:     Hematologic:       Musculoskeletal:       GI/Hepatic:    (-) GERD   Renal/Genitourinary:       Endo:     (+) thyroid problem,     Psychiatric/Substance Use:       Infectious Disease:       Malignancy:       Other:            Physical Exam    Airway        Mallampati: II   TM distance: > 3 FB   Neck ROM: full   Mouth opening: > 3 cm    Respiratory Devices and Support         Dental  no notable dental history         Cardiovascular   cardiovascular exam normal          Pulmonary   pulmonary exam normal                OUTSIDE LABS:  CBC: No results found for: WBC, HGB, HCT, PLT  BMP:   Lab Results   Component " Value Date     03/30/2021    POTASSIUM 4.1 03/30/2021    CHLORIDE 109 03/30/2021    CO2 26 03/30/2021    BUN 14 03/30/2021    CR 0.57 03/30/2021    GLC 89 03/30/2021     COAGS: No results found for: PTT, INR, FIBR  POC: No results found for: BGM, HCG, HCGS  HEPATIC: No results found for: ALBUMIN, PROTTOTAL, ALT, AST, GGT, ALKPHOS, BILITOTAL, BILIDIRECT, HIWOT  OTHER:   Lab Results   Component Value Date    MEDHAT 9.9 03/30/2021       Anesthesia Plan    ASA Status:  2      Anesthesia Type: General.     - Airway: ETT   Induction: Intravenous.   Maintenance: Balanced.        Consents    Anesthesia Plan(s) and associated risks, benefits, and realistic alternatives discussed. Questions answered and patient/representative(s) expressed understanding.     - Discussed with:  Patient         Postoperative Care    Pain management: IV analgesics, Oral pain medications.   PONV prophylaxis: Ondansetron (or other 5HT-3), Dexamethasone or Solumedrol     Comments:                Marta Trinidad

## 2021-03-31 ENCOUNTER — TELEPHONE (OUTPATIENT)
Dept: SURGERY | Facility: CLINIC | Age: 61
End: 2021-03-31

## 2021-03-31 LAB — COPATH REPORT: NORMAL

## 2021-03-31 NOTE — TELEPHONE ENCOUNTER
Procedure:  Excision of right superior parathyroid adenoma, Unilateral neck exploration    Date:  3/30/21    Surgeon:  Sudha    Patient is wondering when she can resume her plavix.  Per Dr. Haley, patient can resume tonight or tomorrow morning. She verbalized understanding.    Patient is wondering if IV access was needed in her feet. She had two pressure dressings (cotton ball with tape) and is wondering if this is information she should be aware of for future?    Could not find anything in the documentation regarding this. Will discuss with Dr. Haley and notify patient.    Jyoti Her, RN-BSN

## 2021-03-31 NOTE — ANESTHESIA POSTPROCEDURE EVALUATION
Patient: Farzana Livingston Moreno    Procedure(s):  RIGHT NECK EXPLORATION WITH EXCISION PARATHYROID ADENOMA    Diagnosis:Hyperparathyroidism (H) [E21.3]  Diagnosis Additional Information: No value filed.    Anesthesia Type:  General    Note:  Disposition: Inpatient   Postop Pain Control: Uneventful            Sign Out: Well controlled pain   PONV: No   Neuro/Psych: Uneventful            Sign Out: Acceptable/Baseline neuro status   Airway/Respiratory: Uneventful            Sign Out: Acceptable/Baseline resp. status   CV/Hemodynamics: Uneventful            Sign Out: Acceptable CV status   Other NRE: NONE   DID A NON-ROUTINE EVENT OCCUR? No         Last vitals:  Vitals:    03/30/21 1730 03/30/21 1745 03/30/21 1835   BP: 129/76 129/82 (!) 142/81   Pulse: 79 83 75   Resp:   14   Temp:  36.9  C (98.5  F) 36.6  C (97.9  F)   SpO2: 99% 98% 97%       Last vitals prior to Anesthesia Care Transfer:  CRNA VITALS  3/30/2021 1617 - 3/30/2021 1717      3/30/2021             Resp Rate (set):  10          Electronically Signed By: Sivakumar Ferro DO  March 30, 2021  7:08 PM

## 2021-03-31 NOTE — TELEPHONE ENCOUNTER
Name of caller: Patient    Reason for Call:  Medication Question  Patient is wondering when she can start taking Plavix again?    Surgeon:  Dr. Haley     Recent Surgery:  Yes.    If yes, when & what type:  3/30/2021    RIGHT NECK EXPLORATION WITH EXCISION PARATHYROID ADENOMA      Best phone number to reach pt at is: [phone]    Ok to leave a message with medical info? Yes.

## 2021-03-31 NOTE — OP NOTE
General Surgery Operative Note    PREOPERATIVE DIAGNOSIS:  Primary hyperparathyroidism.    POSTOPERATIVE DIAGNOSIS:  Primary hyperparathyroidism.    PROCEDURE:   Excision of right superior parathyroid adenoma, Unilateral neck exploration.    ANESTHESIA:  General.    PREOPERATIVE MEDICATIONS:  Ancef IV.    SURGEON:  Mauricio Haley MD, MD    ASSISTANT:  Dorinda Millard MD.  First assistant was necessary due to challenging exposure and the need for improved visualization and help maintaining hemostasis.      ESTIMATED BLOOD LOSS:  10 cc's    INDICATIONS:  Farzana Moreno is a 60 year old female who has primary hyperparathyroidism. She has had symptoms of bone pain.  She has been found to have an elevated calcium of 11.1.  She has a localizing neck CT in the right middle neck.  She presents today for neck exploration, excision of parathyroid adenoma.  Her PTH preoperatively is 105.    DESCRIPTION OF PROCEDURE:  The patient was placed supine, head and neck in extension and a bump between the scapulae.  Transverse cervical neck creases had been marked in the preinduction area and the one most suitable was utilized for exposure.  Incision was made, superior and inferior skin flaps raised.  Midline fascia opened and reflected to the right.  An abnormal parathyroid was identified at the right superior location.  It was meticulously dissected from the surrounding tissue and submitted for frozen section which confirmed a 0.25 gram hypercellular parathyroid.  We then explored the right inferior neck.  During the exploration, I encountered what I believe to be the second parathyroid. It was not biopsied but appeared to represent normal parathyroid tissue. The site was irrigated and inspected for hemostasis.  The PTH assays were sent at 15 and 25 minutes post-excision and the first value came back at 25, signifying the completeness of the dissection.  The patient's incision site was then closed with running 3-0 Vicryl  for the midline fascia, interrupted for platysma and 4-0 subcuticular Monocryl for skin.  Marcaine 0.25% plain was instilled and the patient transferred to recovery in good condition.    INTRAOPERATIVE FINDINGS:  1.  A 0.25 gram hypercellular right superior parathyroid correlated nicely with sestamibi scan.  2.  Second right-sided parathyroid appeared to be grossly normal.  3.  PTH assay diminished from 105 to 25 at 15 minutes post excision.  4.  Grossly normal thyroid.    Specimens:   ID Type Source Tests Collected by Time Destination   A : RIGHT SUPERIOR PARATHYROID  Tissue Parathyroid SURGICAL PATHOLOGY EXAM Mauricio Haley MD 3/30/2021  3:14 PM        Mauricio Haley MD, MD

## 2021-04-13 ENCOUNTER — OFFICE VISIT (OUTPATIENT)
Dept: SURGERY | Facility: CLINIC | Age: 61
End: 2021-04-13
Payer: COMMERCIAL

## 2021-04-13 DIAGNOSIS — Z09 SURGERY FOLLOW-UP EXAMINATION: Primary | ICD-10-CM

## 2021-04-13 PROCEDURE — 99024 POSTOP FOLLOW-UP VISIT: CPT | Performed by: SURGERY

## 2021-04-13 NOTE — LETTER
April 15, 2021          Kiara Garza MD  ENDOCRINOLOGY [facility]  [address] [facility] [address]  [place] [address]      RE:   Farzana Moreno 1960      Dear Colleague,    Thank you for referring your patient, Farzana Moreno, to [facility] [place]. Please see a copy of my visit note below.    Surgery Postop Note     Farzana Moreno presents today for surgical followup.  she is doing well following parathyroidectomy.  Incisions look fine with no signs of wound infection.  Incision looks good.  We removed a 250 mg R inferior parathyroid and her PTH dropped from 105 to 18.  I expect her to make a complete recovery.      Again, thank you for allowing me to participate in the care of your patient.      Sincerely,      Mauricio Haley MD

## 2021-04-15 NOTE — PROGRESS NOTES
Surgery Postop Note    Farzana Moreno presents today for surgical followup.  she is doing well following parathyroidectomy.  Incisions look fine with no signs of wound infection.  Incision looks good.  We removed a 250 mg R inferior parathyroid and her PTH dropped from 105 to 18.  I expect her to make a complete recovery.  Thank you for the opportunity to help in her care.    Toño Haley M.D.  [facility]  [phone]    Please route or send letter to:  Primary Care Provider (PCP) and Referring Provider    Please send Dr. Garza a copy of the operative note and the pathology report.

## 2021-10-10 ENCOUNTER — HEALTH MAINTENANCE LETTER (OUTPATIENT)
Age: 61
End: 2021-10-10

## 2022-01-30 ENCOUNTER — HEALTH MAINTENANCE LETTER (OUTPATIENT)
Age: 62
End: 2022-01-30

## 2022-09-18 ENCOUNTER — HEALTH MAINTENANCE LETTER (OUTPATIENT)
Age: 62
End: 2022-09-18

## 2023-01-29 ENCOUNTER — HEALTH MAINTENANCE LETTER (OUTPATIENT)
Age: 63
End: 2023-01-29

## 2023-05-07 ENCOUNTER — HEALTH MAINTENANCE LETTER (OUTPATIENT)
Age: 63
End: 2023-05-07

## (undated) DEVICE — GLOVE PROTEXIS BLUE W/NEU-THERA 7.5  2D73EB75

## (undated) DEVICE — SU MONOCRYL 4-0 P-3 18" UND Y494G

## (undated) DEVICE — LINEN TOWEL PACK X5 5464

## (undated) DEVICE — GLOVE PROTEXIS W/NEU-THERA 7.5  2D73TE75

## (undated) DEVICE — SYR 03ML LL W/O NDL 309657

## (undated) DEVICE — SOL WATER IRRIG 1000ML BOTTLE 2F7114

## (undated) DEVICE — WIPES FOLEY CARE SURESTEP PROVON DFC100

## (undated) DEVICE — ESU ELEC BLADE 2.75" COATED/INSULATED E1455

## (undated) DEVICE — ESU PENCIL W/SMOKE EVAC CVPLP2000

## (undated) DEVICE — SU VICRYL 2-0 TIE 12X18" J905T

## (undated) DEVICE — PREP CHLORAPREP W/ORANGE TINT 10.5ML 930715

## (undated) DEVICE — SYR EAR BULB 3OZ 0035830

## (undated) DEVICE — ESU GROUND PAD UNIVERSAL W/O CORD

## (undated) DEVICE — Device

## (undated) DEVICE — NIM PROBE PRASS STIMULATOR PROTECTED TIP 8225101

## (undated) DEVICE — SU VICRYL 4-0 TIE 12X18" DYED J103T

## (undated) DEVICE — PACK MINOR SBA15MIFSE

## (undated) DEVICE — SOL NACL 0.9% IRRIG 1000ML BOTTLE 2F7124

## (undated) DEVICE — PACK UNIVERSAL SPLIT 29131

## (undated) DEVICE — ESU DISSECTOR SONICISION CORDLESS 39CM SCD396

## (undated) DEVICE — SUCTION CANISTER MEDIVAC LINER 3000ML W/LID 65651-530

## (undated) DEVICE — SU VICRYL 3-0 SH 27" UND J416H

## (undated) DEVICE — BLADE KNIFE SURG 15 371115

## (undated) RX ORDER — ONDANSETRON 2 MG/ML
INJECTION INTRAMUSCULAR; INTRAVENOUS
Status: DISPENSED
Start: 2021-03-30

## (undated) RX ORDER — PROPOFOL 10 MG/ML
INJECTION, EMULSION INTRAVENOUS
Status: DISPENSED
Start: 2021-03-30

## (undated) RX ORDER — LIDOCAINE HYDROCHLORIDE 20 MG/ML
INJECTION, SOLUTION EPIDURAL; INFILTRATION; INTRACAUDAL; PERINEURAL
Status: DISPENSED
Start: 2021-03-30

## (undated) RX ORDER — SCOLOPAMINE TRANSDERMAL SYSTEM 1 MG/1
PATCH, EXTENDED RELEASE TRANSDERMAL
Status: DISPENSED
Start: 2021-03-30

## (undated) RX ORDER — FENTANYL CITRATE 50 UG/ML
INJECTION, SOLUTION INTRAMUSCULAR; INTRAVENOUS
Status: DISPENSED
Start: 2021-03-30

## (undated) RX ORDER — REMIFENTANIL HYDROCHLORIDE 1 MG/ML
INJECTION, POWDER, LYOPHILIZED, FOR SOLUTION INTRAVENOUS
Status: DISPENSED
Start: 2021-03-30

## (undated) RX ORDER — CEFAZOLIN SODIUM 2 G/100ML
INJECTION, SOLUTION INTRAVENOUS
Status: DISPENSED
Start: 2021-03-30

## (undated) RX ORDER — DEXAMETHASONE SODIUM PHOSPHATE 4 MG/ML
INJECTION, SOLUTION INTRA-ARTICULAR; INTRALESIONAL; INTRAMUSCULAR; INTRAVENOUS; SOFT TISSUE
Status: DISPENSED
Start: 2021-03-30